# Patient Record
Sex: MALE | Race: WHITE
[De-identification: names, ages, dates, MRNs, and addresses within clinical notes are randomized per-mention and may not be internally consistent; named-entity substitution may affect disease eponyms.]

---

## 2018-03-09 ENCOUNTER — APPOINTMENT (OUTPATIENT)
Dept: ORTHOPEDIC SURGERY | Facility: CLINIC | Age: 58
End: 2018-03-09

## 2022-01-13 ENCOUNTER — APPOINTMENT (OUTPATIENT)
Dept: ENDOCRINOLOGY | Facility: CLINIC | Age: 62
End: 2022-01-13
Payer: COMMERCIAL

## 2022-01-13 ENCOUNTER — NON-APPOINTMENT (OUTPATIENT)
Age: 62
End: 2022-01-13

## 2022-01-13 VITALS
SYSTOLIC BLOOD PRESSURE: 104 MMHG | TEMPERATURE: 97.3 F | BODY MASS INDEX: 30.36 KG/M2 | WEIGHT: 205 LBS | OXYGEN SATURATION: 97 % | HEIGHT: 69 IN | RESPIRATION RATE: 16 BRPM | HEART RATE: 67 BPM | DIASTOLIC BLOOD PRESSURE: 60 MMHG

## 2022-01-13 DIAGNOSIS — Z00.00 ENCOUNTER FOR GENERAL ADULT MEDICAL EXAMINATION W/OUT ABNORMAL FINDINGS: ICD-10-CM

## 2022-01-13 DIAGNOSIS — I25.10 ATHEROSCLEROTIC HEART DISEASE OF NATIVE CORONARY ARTERY W/OUT ANGINA PECTORIS: ICD-10-CM

## 2022-01-13 DIAGNOSIS — Z83.3 FAMILY HISTORY OF DIABETES MELLITUS: ICD-10-CM

## 2022-01-13 LAB — GLUCOSE BLDC GLUCOMTR-MCNC: 113

## 2022-01-13 PROCEDURE — 36415 COLL VENOUS BLD VENIPUNCTURE: CPT

## 2022-01-13 PROCEDURE — 95251 CONT GLUC MNTR ANALYSIS I&R: CPT

## 2022-01-13 PROCEDURE — 95250 CONT GLUC MNTR PHYS/QHP EQP: CPT

## 2022-01-13 PROCEDURE — 99205 OFFICE O/P NEW HI 60 MIN: CPT | Mod: 25

## 2022-01-13 NOTE — ASSESSMENT
[Diabetes Foot Care] : diabetes foot care [Long Term Vascular Complications] : long term vascular complications of diabetes [Carbohydrate Consistent Diet] : carbohydrate consistent diet [Importance of Diet and Exercise] : importance of diet and exercise to improve glycemic control, achieve weight loss and improve cardiovascular health [Exercise/Effect on Glucose] : exercise/effect on glucose [Hypoglycemia Management] : hypoglycemia management [Glucagon Use] : glucagon use [Self Monitoring of Blood Glucose] : self monitoring of blood glucose [Retinopathy Screening] : Patient was referred to ophthalmology for retinopathy screening [Diabetic Medications] : Risks and benefits of diabetic medications were discussed [FreeTextEntry1] : 1. T2DM\par Current approaches to diabetes management are discussed with the patient. \par Target ranges for blood sugar, blood pressure and cholesterol reviewed, and risk reduction strategies verified. \par Hypoglycemia precautions reviewed with the patient. \par Suggested extensive diabetes education program, including nutritional and diabetes teaching and evaluation. \par Proper dietary restrictions and exercise routines discussed. \par Glucometer/SMBG and log book charting discussed.\par - Freida PRO and he'll consider wearing a personal CGMS\par - labs today\par - continue Jardiance 25 mg qd\par - potential options for GLP1 agonists vs DPP4 inhibitors, or even retrying a small dose of metformin ER reviewed\par - Lipitor 20 mg HS, and potentially uptitrate the dose vs switching to crestor (LDL goal < 70)\par - Losartan 50 mg qd\par \par \par 2. Abnormal TFT's\par - repeat full thyroid panel, TBG, antibodies\par \par RTC 2- 3 weeks for sensor download and CDE evaluation\par

## 2022-01-13 NOTE — HISTORY OF PRESENT ILLNESS
[FreeTextEntry1] : HISTORY OF PRESENT ILLNESS. \par \par Mr. LARSON was diagnosed with Diabetes Mellitus Type 2  more than 5 years ago. He reports history HTN, dyslipidemia, CAD (s/p PTCA x 1 in 05/21) and denies known complications of retinopathy, nephropathy, or neuropathy. He is presently on Jardiance 25 mg, Losartan 50 mg, Lipitor 20 mg HS. He was on metformin before, but stopped because of the stomach discomfort\par Blood sugars are checked once a day. \par Did not bring log book, but reported are typically as following: FBS- 120-160's, ppg- not checking\par Hypoglycemia frequency: none\par Fingerstick glucose in the office today is 113 mg/dL (fasting). \par Diet: not following ADA\par Exercise: none. Owns a transportation business.\par \par Lab review (8/21): a1c- 6.8, TSH- 0.53, T4- 15.3, LDL- 75, neg urine microalbumin, creat- 0.64, calcium- 8.8\par \par Last dilated eye - 2021\par Last podiatry visit  - none\par Last cardiology evaluation - Dr. Ryder (7/21)\par Last stress test - 4/21\par Last 2-D Echo -4/21\par Last nephrology evaluation - none\par Last neurology evaluation-none\par

## 2022-01-13 NOTE — CONSULT LETTER
[Dear  ___] : Dear  [unfilled], [Courtesy Letter:] : I had the pleasure of seeing your patient, [unfilled], in my office today. [Sincerely,] : Sincerely, [FreeTextEntry1] : Thank you for referring  Mr. BARRY LARSON to me for evaluation and treatment. Please, see attached consultation note. As always, if there are specific questions you would like to discuss, please feel free to contact me.\par Thank you for the courtesy of this evaluation.\par  [FreeTextEntry3] : Bhavya Helm MD, FACE, [facility]\par

## 2022-01-21 LAB
25(OH)D3 SERPL-MCNC: 24.7 NG/ML
ALBUMIN SERPL ELPH-MCNC: 5 G/DL
ALP BLD-CCNC: 50 U/L
ALT SERPL-CCNC: 37 U/L
ANION GAP SERPL CALC-SCNC: 17 MMOL/L
AST SERPL-CCNC: 24 U/L
BILIRUB SERPL-MCNC: 0.6 MG/DL
BUN SERPL-MCNC: 12 MG/DL
CALCIUM SERPL-MCNC: 9.9 MG/DL
CHLORIDE SERPL-SCNC: 99 MMOL/L
CHOLEST SERPL-MCNC: 164 MG/DL
CO2 SERPL-SCNC: 22 MMOL/L
CREAT SERPL-MCNC: 0.79 MG/DL
CREAT SPEC-SCNC: 52 MG/DL
ESTIMATED AVERAGE GLUCOSE: 154 MG/DL
FOLATE SERPL-MCNC: 15.7 NG/ML
FRUCTOSAMINE SERPL-MCNC: 309 UMOL/L
GLUCOSE SERPL-MCNC: 111 MG/DL
HBA1C MFR BLD HPLC: 7 %
HDLC SERPL-MCNC: 77 MG/DL
LDLC SERPL CALC-MCNC: 42 MG/DL
MICROALBUMIN 24H UR DL<=1MG/L-MCNC: <1.2 MG/DL
MICROALBUMIN/CREAT 24H UR-RTO: NORMAL MG/G
NONHDLC SERPL-MCNC: 87 MG/DL
POTASSIUM SERPL-SCNC: 4.8 MMOL/L
PROT SERPL-MCNC: 7.5 G/DL
SODIUM SERPL-SCNC: 138 MMOL/L
T3 SERPL-MCNC: 154 NG/DL
T4 FREE SERPL-MCNC: 1.3 NG/DL
T4 SERPL-MCNC: 12.3 UG/DL
T4BG SERPL-MCNC: 29 UG/ML
THYROGLOB AB SERPL-ACNC: <20 IU/ML
THYROPEROXIDASE AB SERPL IA-ACNC: <10 IU/ML
TRIGL SERPL-MCNC: 226 MG/DL
TSH RECEPTOR AB: <1.1 IU/L
TSH SERPL-ACNC: 0.61 UIU/ML
TSI ACT/NOR SER: <0.1 IU/L
VIT B12 SERPL-MCNC: 462 PG/ML

## 2022-01-23 LAB — T4 FREE SERPL DIALY-MCNC: 1.5 NG/DL

## 2022-02-01 ENCOUNTER — APPOINTMENT (OUTPATIENT)
Dept: ENDOCRINOLOGY | Facility: CLINIC | Age: 62
End: 2022-02-01
Payer: COMMERCIAL

## 2022-02-01 PROCEDURE — G0108 DIAB MANAGE TRN  PER INDIV: CPT

## 2022-03-02 ENCOUNTER — RX RENEWAL (OUTPATIENT)
Age: 62
End: 2022-03-02

## 2022-04-25 ENCOUNTER — APPOINTMENT (OUTPATIENT)
Dept: ENDOCRINOLOGY | Facility: CLINIC | Age: 62
End: 2022-04-25
Payer: COMMERCIAL

## 2022-04-25 VITALS
TEMPERATURE: 98 F | OXYGEN SATURATION: 98 % | BODY MASS INDEX: 31.1 KG/M2 | SYSTOLIC BLOOD PRESSURE: 128 MMHG | HEART RATE: 90 BPM | DIASTOLIC BLOOD PRESSURE: 70 MMHG | HEIGHT: 69 IN | WEIGHT: 210 LBS

## 2022-04-25 LAB — GLUCOSE BLDC GLUCOMTR-MCNC: 183

## 2022-04-25 PROCEDURE — 99214 OFFICE O/P EST MOD 30 MIN: CPT | Mod: 25

## 2022-04-25 PROCEDURE — 82962 GLUCOSE BLOOD TEST: CPT

## 2022-04-25 RX ORDER — ATORVASTATIN CALCIUM 20 MG/1
20 TABLET, FILM COATED ORAL
Refills: 0 | Status: DISCONTINUED | COMMUNITY
End: 2022-04-25

## 2022-04-25 NOTE — HISTORY OF PRESENT ILLNESS
[FreeTextEntry1] : F/u for diabetes management\par \par *** Apr 25, 2022 ***\par \par taking Ozempic 0.5 mg qw, Jardiance 25 mg, Losartan 100 mg, Lipitor 20 mg HS\par feels great, but gained some weight. not exercising now. Diet might be a bit worse, wine - 2 glasses a day with snacks\par labs from 4/5/22- a1c- 6.1, TSH- 1.96, TG- 224, LDL- 75\par reports fbs- under 120, not checking\par \par \par HISTORY OF PRESENT ILLNESS. \par \par Mr. LARSON was diagnosed with Diabetes Mellitus Type 2  more than 5 years ago. He reports history HTN, dyslipidemia, CAD (s/p PTCA x 1 in 05/21) and denies known complications of retinopathy, nephropathy, or neuropathy. He is presently on Jardiance 25 mg, Losartan 50 mg, Lipitor 20 mg HS. He was on metformin before, but stopped because of the stomach discomfort\par Blood sugars are checked once a day. \par Did not bring log book, but reported are typically as following: FBS- 120-160's, ppg- not checking\par Hypoglycemia frequency: none\par Fingerstick glucose in the office today is 113 mg/dL (fasting). \par Diet: not following ADA\par Exercise: none. Owns a transportation business.\par \par Lab review (8/21): a1c- 6.8, TSH- 0.53, T4- 15.3, LDL- 75, neg urine microalbumin, creat- 0.64, calcium- 8.8\par \par Last dilated eye - 2021\par Last podiatry visit  - none\par Last cardiology evaluation - Dr. Ryder (7/21)\par Last stress test - 4/21\par Last 2-D Echo -4/21\par Last nephrology evaluation - none\par Last neurology evaluation-none\par

## 2022-04-25 NOTE — ASSESSMENT
[Diabetes Foot Care] : diabetes foot care [Long Term Vascular Complications] : long term vascular complications of diabetes [Carbohydrate Consistent Diet] : carbohydrate consistent diet [Importance of Diet and Exercise] : importance of diet and exercise to improve glycemic control, achieve weight loss and improve cardiovascular health [Exercise/Effect on Glucose] : exercise/effect on glucose [Hypoglycemia Management] : hypoglycemia management [Glucagon Use] : glucagon use [Self Monitoring of Blood Glucose] : self monitoring of blood glucose [Retinopathy Screening] : Patient was referred to ophthalmology for retinopathy screening [Diabetic Medications] : Risks and benefits of diabetic medications were discussed [FreeTextEntry1] : 1. T2DM, well controlled\par - Jardiance 25 mg qd, Ozempic 0.5 mg qw\par - potential options for GLP1 agonists vs DPP4 inhibitors, or even retrying a small dose of metformin ER reviewed\par - d/c Lipitor. Start Crestor 20 mg HS (LDL goal < 70, will benefit with TG). can add fish oil. improve the diet, exercises\par - Losartan 100 mg qd\par \par \par 2. Abnormal TFT's\par - normal TFT's. elevated total T4 can be secondary to binding proteins\par \par RTC 3-4 mos\par

## 2022-08-15 ENCOUNTER — APPOINTMENT (OUTPATIENT)
Dept: ENDOCRINOLOGY | Facility: CLINIC | Age: 62
End: 2022-08-15

## 2022-08-15 VITALS
HEART RATE: 73 BPM | WEIGHT: 197 LBS | SYSTOLIC BLOOD PRESSURE: 136 MMHG | BODY MASS INDEX: 29.18 KG/M2 | OXYGEN SATURATION: 96 % | RESPIRATION RATE: 17 BRPM | TEMPERATURE: 97.7 F | HEIGHT: 69 IN | DIASTOLIC BLOOD PRESSURE: 82 MMHG

## 2022-08-15 LAB — GLUCOSE BLDC GLUCOMTR-MCNC: 107

## 2022-08-15 PROCEDURE — 99214 OFFICE O/P EST MOD 30 MIN: CPT | Mod: 25

## 2022-08-15 PROCEDURE — 36415 COLL VENOUS BLD VENIPUNCTURE: CPT

## 2022-08-15 PROCEDURE — 82962 GLUCOSE BLOOD TEST: CPT

## 2022-08-15 RX ORDER — PRASUGREL 10 MG/1
10 TABLET, FILM COATED ORAL DAILY
Refills: 0 | Status: DISCONTINUED | COMMUNITY
End: 2022-08-15

## 2022-08-15 NOTE — ASSESSMENT
[Diabetes Foot Care] : diabetes foot care [Long Term Vascular Complications] : long term vascular complications of diabetes [Carbohydrate Consistent Diet] : carbohydrate consistent diet [Importance of Diet and Exercise] : importance of diet and exercise to improve glycemic control, achieve weight loss and improve cardiovascular health [Exercise/Effect on Glucose] : exercise/effect on glucose [Hypoglycemia Management] : hypoglycemia management [Glucagon Use] : glucagon use [Self Monitoring of Blood Glucose] : self monitoring of blood glucose [Retinopathy Screening] : Patient was referred to ophthalmology for retinopathy screening [Diabetic Medications] : Risks and benefits of diabetic medications were discussed [FreeTextEntry1] : 1. T2DM, well controlled\par - Jardiance 25 mg qd, Ozempic 0.5 mg qw (patient declined increasing the dose)\par - potential  retrying a small dose of metformin ER reviewed\par - Crestor 20 mg HS (LDL goal < 70, will benefit with TG). can add fish oil. improve the diet, exercises\par - Losartan 100 mg qd\par \par \par 2. Abnormal TFT's\par - normal TFT's. elevated total T4 can be secondary to binding proteins\par \par RTC 4 mos\par

## 2022-08-15 NOTE — HISTORY OF PRESENT ILLNESS
[FreeTextEntry1] : F/u for diabetes management\par \par *** Aug 15, 2022 ***\par \par feels great, lost more weight. physically active\par on Jardiance 25 mg qd, Ozempic 0.5 mg qw, Losartan 100 mg,  Crestor 20 mg HS\par stopped blood thinners\par reports fbs- 110-120, ppg- not checking\par \par *** Apr 25, 2022 ***\par \par taking Ozempic 0.5 mg qw, Jardiance 25 mg, Losartan 100 mg, Lipitor 20 mg HS\par feels great, but gained some weight. not exercising now. Diet might be a bit worse, wine - 2 glasses a day with snacks\par labs from 4/5/22- a1c- 6.1, TSH- 1.96, TG- 224, LDL- 75\par reports fbs- under 120, not checking\par \par \par HISTORY OF PRESENT ILLNESS. \par \par Mr. LARSON was diagnosed with Diabetes Mellitus Type 2  more than 5 years ago. He reports history HTN, dyslipidemia, CAD (s/p PTCA x 1 in 05/21) and denies known complications of retinopathy, nephropathy, or neuropathy. He is presently on Jardiance 25 mg, Losartan 50 mg, Lipitor 20 mg HS. He was on metformin before, but stopped because of the stomach discomfort\par Blood sugars are checked once a day. \par Did not bring log book, but reported are typically as following: FBS- 120-160's, ppg- not checking\par Hypoglycemia frequency: none\par Fingerstick glucose in the office today is 113 mg/dL (fasting). \par Diet: not following ADA\par Exercise: none. Owns a transportation business.\par \par Lab review (8/21): a1c- 6.8, TSH- 0.53, T4- 15.3, LDL- 75, neg urine microalbumin, creat- 0.64, calcium- 8.8\par \par Last dilated eye - 2021\par Last podiatry visit  - none\par Last cardiology evaluation - Dr. Ryder (7/21)\par Last stress test - 4/21\par Last 2-D Echo -4/21\par Last nephrology evaluation - none\par Last neurology evaluation-none\par

## 2022-08-19 LAB
25(OH)D3 SERPL-MCNC: 43.9 NG/ML
ALBUMIN SERPL ELPH-MCNC: 4.9 G/DL
ALP BLD-CCNC: 39 U/L
ALT SERPL-CCNC: 30 U/L
ANION GAP SERPL CALC-SCNC: 16 MMOL/L
AST SERPL-CCNC: 23 U/L
BILIRUB SERPL-MCNC: 0.4 MG/DL
BUN SERPL-MCNC: 11 MG/DL
CALCIUM SERPL-MCNC: 9.7 MG/DL
CHLORIDE SERPL-SCNC: 101 MMOL/L
CHOLEST SERPL-MCNC: 132 MG/DL
CO2 SERPL-SCNC: 22 MMOL/L
CREAT SERPL-MCNC: 0.73 MG/DL
CREAT SPEC-SCNC: 79 MG/DL
EGFR: 103 ML/MIN/1.73M2
ESTIMATED AVERAGE GLUCOSE: 143 MG/DL
FRUCTOSAMINE SERPL-MCNC: 282 UMOL/L
GLUCOSE SERPL-MCNC: 105 MG/DL
HBA1C MFR BLD HPLC: 6.6 %
HDLC SERPL-MCNC: 60 MG/DL
LDLC SERPL CALC-MCNC: 57 MG/DL
MICROALBUMIN 24H UR DL<=1MG/L-MCNC: <1.2 MG/DL
MICROALBUMIN/CREAT 24H UR-RTO: NORMAL MG/G
NONHDLC SERPL-MCNC: 72 MG/DL
POTASSIUM SERPL-SCNC: 4.4 MMOL/L
PROT SERPL-MCNC: 7.1 G/DL
SODIUM SERPL-SCNC: 138 MMOL/L
T4 FREE SERPL-MCNC: 1.3 NG/DL
THYROGLOB AB SERPL-ACNC: <20 IU/ML
THYROPEROXIDASE AB SERPL IA-ACNC: 13 IU/ML
TRIGL SERPL-MCNC: 77 MG/DL
TSH SERPL-ACNC: 0.83 UIU/ML

## 2022-08-23 LAB — T4 FREE SERPL DIALY-MCNC: 1.2 NG/DL

## 2022-12-15 ENCOUNTER — APPOINTMENT (OUTPATIENT)
Dept: ENDOCRINOLOGY | Facility: CLINIC | Age: 62
End: 2022-12-15

## 2022-12-15 VITALS
WEIGHT: 197 LBS | OXYGEN SATURATION: 97 % | HEART RATE: 74 BPM | RESPIRATION RATE: 16 BRPM | TEMPERATURE: 97.3 F | DIASTOLIC BLOOD PRESSURE: 68 MMHG | BODY MASS INDEX: 29.18 KG/M2 | SYSTOLIC BLOOD PRESSURE: 132 MMHG | HEIGHT: 69 IN

## 2022-12-15 LAB
FRUCTOSAMINE SERPL-MCNC: 297 UMOL/L
GLUCOSE BLDC GLUCOMTR-MCNC: 116

## 2022-12-15 PROCEDURE — 82962 GLUCOSE BLOOD TEST: CPT

## 2022-12-15 PROCEDURE — 99214 OFFICE O/P EST MOD 30 MIN: CPT | Mod: 25

## 2022-12-15 PROCEDURE — 36415 COLL VENOUS BLD VENIPUNCTURE: CPT

## 2022-12-15 NOTE — ASSESSMENT
[Diabetes Foot Care] : diabetes foot care [Long Term Vascular Complications] : long term vascular complications of diabetes [Carbohydrate Consistent Diet] : carbohydrate consistent diet [Importance of Diet and Exercise] : importance of diet and exercise to improve glycemic control, achieve weight loss and improve cardiovascular health [Exercise/Effect on Glucose] : exercise/effect on glucose [Hypoglycemia Management] : hypoglycemia management [Glucagon Use] : glucagon use [Self Monitoring of Blood Glucose] : self monitoring of blood glucose [Retinopathy Screening] : Patient was referred to ophthalmology for retinopathy screening [Diabetic Medications] : Risks and benefits of diabetic medications were discussed [FreeTextEntry1] : 1. T2DM, well controlled\par - Jardiance 25 mg qd, Ozempic 0.5 mg qw (patient declined increasing the dose)\par - potential  retrying a small dose of metformin ER reviewed\par - Crestor 20 mg HS (LDL goal < 70, will benefit with TG). can add fish oil. improve the diet, exercises\par - Losartan 50 mg qd\par \par 2. Abnormal TFT's\par - normal TFT's. elevated total T4 can be secondary to binding proteins\par \par RTC 4 mos\par

## 2022-12-15 NOTE — HISTORY OF PRESENT ILLNESS
[FreeTextEntry1] : F/u for diabetes management\par \par *** Dec 15, 2022 ***\par \par feels great, no new c/o. keeping the weight stable\par stays on Jardiance 25 mg qd, Ozempic 0.5 mg qw,  Losartan 50 mg (went back on a smaller dose),  Crestor 20 mg HS\par reports fbs- 120's\par \par *** Aug 15, 2022 ***\par \par feels great, lost more weight. physically active\par on Jardiance 25 mg qd, Ozempic 0.5 mg qw, Losartan 100 mg,  Crestor 20 mg HS\par stopped blood thinners\par reports fbs- 110-120, ppg- not checking\par \par *** Apr 25, 2022 ***\par \par taking Ozempic 0.5 mg qw, Jardiance 25 mg, Losartan 100 mg, Lipitor 20 mg HS\par feels great, but gained some weight. not exercising now. Diet might be a bit worse, wine - 2 glasses a day with snacks\par labs from 4/5/22- a1c- 6.1, TSH- 1.96, TG- 224, LDL- 75\par reports fbs- under 120, not checking\par \par \par HISTORY OF PRESENT ILLNESS. \par \par Mr. LARSON was diagnosed with Diabetes Mellitus Type 2  more than 5 years ago. He reports history HTN, dyslipidemia, CAD (s/p PTCA x 1 in 05/21) and denies known complications of retinopathy, nephropathy, or neuropathy. He is presently on Jardiance 25 mg, Losartan 50 mg, Lipitor 20 mg HS. He was on metformin before, but stopped because of the stomach discomfort\par Blood sugars are checked once a day. \par Did not bring log book, but reported are typically as following: FBS- 120-160's, ppg- not checking\par Hypoglycemia frequency: none\par Fingerstick glucose in the office today is 113 mg/dL (fasting). \par Diet: not following ADA\par Exercise: none. Owns a transportation business.\par \par Lab review (8/21): a1c- 6.8, TSH- 0.53, T4- 15.3, LDL- 75, neg urine microalbumin, creat- 0.64, calcium- 8.8\par \par Last dilated eye - 2021\par Last podiatry visit  - none\par Last cardiology evaluation - Dr. Ryder (7/21)\par Last stress test - 4/21\par Last 2-D Echo -4/21\par Last nephrology evaluation - none\par Last neurology evaluation-none\par

## 2022-12-16 LAB
ALBUMIN SERPL ELPH-MCNC: 4.9 G/DL
ALP BLD-CCNC: 46 U/L
ALT SERPL-CCNC: 30 U/L
ANION GAP SERPL CALC-SCNC: 18 MMOL/L
AST SERPL-CCNC: 21 U/L
BILIRUB SERPL-MCNC: 0.4 MG/DL
BUN SERPL-MCNC: 20 MG/DL
CALCIUM SERPL-MCNC: 9.7 MG/DL
CHLORIDE SERPL-SCNC: 98 MMOL/L
CHOLEST SERPL-MCNC: 153 MG/DL
CO2 SERPL-SCNC: 22 MMOL/L
CREAT SERPL-MCNC: 0.83 MG/DL
CREAT SPEC-SCNC: 53 MG/DL
EGFR: 99 ML/MIN/1.73M2
ESTIMATED AVERAGE GLUCOSE: 148 MG/DL
FOLATE SERPL-MCNC: >20 NG/ML
GLUCOSE SERPL-MCNC: 100 MG/DL
HBA1C MFR BLD HPLC: 6.8 %
HDLC SERPL-MCNC: 71 MG/DL
LDLC SERPL CALC-MCNC: 60 MG/DL
MICROALBUMIN 24H UR DL<=1MG/L-MCNC: <1.2 MG/DL
MICROALBUMIN/CREAT 24H UR-RTO: NORMAL MG/G
NONHDLC SERPL-MCNC: 82 MG/DL
POTASSIUM SERPL-SCNC: 4.5 MMOL/L
PROT SERPL-MCNC: 7.4 G/DL
SODIUM SERPL-SCNC: 138 MMOL/L
T4 FREE SERPL-MCNC: 1.3 NG/DL
TRIGL SERPL-MCNC: 110 MG/DL
TSH SERPL-ACNC: 0.9 UIU/ML
VIT B12 SERPL-MCNC: 688 PG/ML

## 2023-04-18 ENCOUNTER — APPOINTMENT (OUTPATIENT)
Dept: ENDOCRINOLOGY | Facility: CLINIC | Age: 63
End: 2023-04-18
Payer: COMMERCIAL

## 2023-04-18 VITALS
HEIGHT: 69 IN | HEART RATE: 70 BPM | SYSTOLIC BLOOD PRESSURE: 120 MMHG | BODY MASS INDEX: 30.21 KG/M2 | TEMPERATURE: 97.3 F | OXYGEN SATURATION: 95 % | WEIGHT: 204 LBS | RESPIRATION RATE: 16 BRPM | DIASTOLIC BLOOD PRESSURE: 70 MMHG

## 2023-04-18 LAB — GLUCOSE BLDC GLUCOMTR-MCNC: 113

## 2023-04-18 PROCEDURE — 36415 COLL VENOUS BLD VENIPUNCTURE: CPT

## 2023-04-18 PROCEDURE — 99214 OFFICE O/P EST MOD 30 MIN: CPT | Mod: 25

## 2023-04-18 PROCEDURE — 82962 GLUCOSE BLOOD TEST: CPT

## 2023-04-18 NOTE — HISTORY OF PRESENT ILLNESS
[FreeTextEntry1] : F/u for diabetes management\par \par *** Apr 18, 2023 ***\par \par feels great, no new c/o. gained some weight \par stays on Jardiance 25 mg qd, Ozempic 0.5 mg qw,  Losartan 50 mg ,  Crestor 20 mg HS\par reports fbs- 110's\par \par *** Dec 15, 2022 ***\par \par feels great, no new c/o. keeping the weight stable\par stays on Jardiance 25 mg qd, Ozempic 0.5 mg qw,  Losartan 50 mg (went back on a smaller dose),  Crestor 20 mg HS\par reports fbs- 120's\par \par *** Aug 15, 2022 ***\par \par feels great, lost more weight. physically active\par on Jardiance 25 mg qd, Ozempic 0.5 mg qw, Losartan 100 mg,  Crestor 20 mg HS\par stopped blood thinners\par reports fbs- 110-120, ppg- not checking\par \par *** Apr 25, 2022 ***\par \par taking Ozempic 0.5 mg qw, Jardiance 25 mg, Losartan 100 mg, Lipitor 20 mg HS\par feels great, but gained some weight. not exercising now. Diet might be a bit worse, wine - 2 glasses a day with snacks\par labs from 4/5/22- a1c- 6.1, TSH- 1.96, TG- 224, LDL- 75\par reports fbs- under 120, not checking\par \par \par HISTORY OF PRESENT ILLNESS. \par \par Mr. LARSON was diagnosed with Diabetes Mellitus Type 2  more than 5 years ago. He reports history HTN, dyslipidemia, CAD (s/p PTCA x 1 in 05/21) and denies known complications of retinopathy, nephropathy, or neuropathy. He is presently on Jardiance 25 mg, Losartan 50 mg, Lipitor 20 mg HS. He was on metformin before, but stopped because of the stomach discomfort\par Blood sugars are checked once a day. \par Did not bring log book, but reported are typically as following: FBS- 120-160's, ppg- not checking\par Hypoglycemia frequency: none\par Fingerstick glucose in the office today is 113 mg/dL (fasting). \par Diet: not following ADA\par Exercise: none. Owns a transportation business.\par \par Lab review (8/21): a1c- 6.8, TSH- 0.53, T4- 15.3, LDL- 75, neg urine microalbumin, creat- 0.64, calcium- 8.8\par \par Last dilated eye - 2021\par Last podiatry visit  - none\par Last cardiology evaluation - Dr. Ryder (7/21)\par Last stress test - 4/21\par Last 2-D Echo -4/21\par Last nephrology evaluation - none\par Last neurology evaluation-none\par

## 2023-04-18 NOTE — ASSESSMENT
[Diabetes Foot Care] : diabetes foot care [Long Term Vascular Complications] : long term vascular complications of diabetes [Carbohydrate Consistent Diet] : carbohydrate consistent diet [Importance of Diet and Exercise] : importance of diet and exercise to improve glycemic control, achieve weight loss and improve cardiovascular health [Exercise/Effect on Glucose] : exercise/effect on glucose [Hypoglycemia Management] : hypoglycemia management [Glucagon Use] : glucagon use [Self Monitoring of Blood Glucose] : self monitoring of blood glucose [Retinopathy Screening] : Patient was referred to ophthalmology for retinopathy screening [Diabetic Medications] : Risks and benefits of diabetic medications were discussed [FreeTextEntry1] : 1. T2DM, well controlled\par - Jardiance 25 mg qd, Ozempic 0.5 mg qw (patient declined increasing the dose, wants to wait for the blood work results)\par - potential  retrying a small dose of metformin ER reviewed\par - Crestor 20 mg HS (LDL goal < 70, will benefit with TG). can add fish oil. improve the diet, exercises\par - Losartan 50 mg qd\par \par 2. Abnormal TFT's\par - normal TFT's. elevated total T4 can be secondary to binding proteins\par \par RTC 4 mos\par

## 2023-04-19 ENCOUNTER — NON-APPOINTMENT (OUTPATIENT)
Age: 63
End: 2023-04-19

## 2023-04-19 LAB
ALBUMIN SERPL ELPH-MCNC: 4.9 G/DL
ALP BLD-CCNC: 41 U/L
ALT SERPL-CCNC: 44 U/L
ANION GAP SERPL CALC-SCNC: 15 MMOL/L
AST SERPL-CCNC: 30 U/L
BILIRUB SERPL-MCNC: 0.4 MG/DL
BUN SERPL-MCNC: 11 MG/DL
CALCIUM SERPL-MCNC: 9.5 MG/DL
CHLORIDE SERPL-SCNC: 102 MMOL/L
CHOLEST SERPL-MCNC: 157 MG/DL
CO2 SERPL-SCNC: 22 MMOL/L
CREAT SERPL-MCNC: 0.64 MG/DL
CREAT SPEC-SCNC: 45 MG/DL
EGFR: 106 ML/MIN/1.73M2
ESTIMATED AVERAGE GLUCOSE: 146 MG/DL
FOLATE SERPL-MCNC: >20 NG/ML
FRUCTOSAMINE SERPL-MCNC: 301 UMOL/L
GLUCOSE SERPL-MCNC: 114 MG/DL
HBA1C MFR BLD HPLC: 6.7 %
HDLC SERPL-MCNC: 68 MG/DL
LDLC SERPL CALC-MCNC: 59 MG/DL
MICROALBUMIN 24H UR DL<=1MG/L-MCNC: <1.2 MG/DL
MICROALBUMIN/CREAT 24H UR-RTO: NORMAL MG/G
NONHDLC SERPL-MCNC: 89 MG/DL
POTASSIUM SERPL-SCNC: 4.7 MMOL/L
PROT SERPL-MCNC: 7.2 G/DL
SODIUM SERPL-SCNC: 139 MMOL/L
T4 FREE SERPL-MCNC: 1.3 NG/DL
TRIGL SERPL-MCNC: 147 MG/DL
TSH SERPL-ACNC: 1.17 UIU/ML
VIT B12 SERPL-MCNC: 644 PG/ML

## 2023-08-28 ENCOUNTER — APPOINTMENT (OUTPATIENT)
Dept: ENDOCRINOLOGY | Facility: CLINIC | Age: 63
End: 2023-08-28
Payer: COMMERCIAL

## 2023-08-28 VITALS
TEMPERATURE: 97.1 F | SYSTOLIC BLOOD PRESSURE: 136 MMHG | DIASTOLIC BLOOD PRESSURE: 68 MMHG | RESPIRATION RATE: 16 BRPM | OXYGEN SATURATION: 97 % | HEIGHT: 69 IN | BODY MASS INDEX: 29.03 KG/M2 | HEART RATE: 67 BPM | WEIGHT: 196 LBS

## 2023-08-28 DIAGNOSIS — E11.9 TYPE 2 DIABETES MELLITUS W/OUT COMPLICATIONS: ICD-10-CM

## 2023-08-28 LAB — GLUCOSE BLDC GLUCOMTR-MCNC: 131

## 2023-08-28 PROCEDURE — 99214 OFFICE O/P EST MOD 30 MIN: CPT | Mod: 25

## 2023-08-28 PROCEDURE — 36415 COLL VENOUS BLD VENIPUNCTURE: CPT

## 2023-08-28 PROCEDURE — 82962 GLUCOSE BLOOD TEST: CPT

## 2023-08-28 NOTE — HISTORY OF PRESENT ILLNESS
[FreeTextEntry1] : F/u for diabetes management  *** Aug 28, 2023 ***  feels well, diet has been worse over the past several months but able to keep the weight down Jardiance 25 mg qd, Ozempic 0.5 mg qw , Losartan 50 mg ,  Crestor 20 mg HS reports fbs -   *** Apr 18, 2023 ***  feels great, no new c/o. gained some weight  stays on Jardiance 25 mg qd, Ozempic 0.5 mg qw,  Losartan 50 mg ,  Crestor 20 mg HS reports fbs- 110's  *** Dec 15, 2022 ***  feels great, no new c/o. keeping the weight stable stays on Jardiance 25 mg qd, Ozempic 0.5 mg qw,  Losartan 50 mg (went back on a smaller dose),  Crestor 20 mg HS reports fbs- 120's  *** Aug 15, 2022 ***  feels great, lost more weight. physically active on Jardiance 25 mg qd, Ozempic 0.5 mg qw, Losartan 100 mg,  Crestor 20 mg HS stopped blood thinners reports fbs- 110-120, ppg- not checking  *** Apr 25, 2022 ***  taking Ozempic 0.5 mg qw, Jardiance 25 mg, Losartan 100 mg, Lipitor 20 mg HS feels great, but gained some weight. not exercising now. Diet might be a bit worse, wine - 2 glasses a day with snacks labs from 4/5/22- a1c- 6.1, TSH- 1.96, TG- 224, LDL- 75 reports fbs- under 120, not checking   HISTORY OF PRESENT ILLNESS.   Mr. LARSON was diagnosed with Diabetes Mellitus Type 2  more than 5 years ago. He reports history HTN, dyslipidemia, CAD (s/p PTCA x 1 in 05/21) and denies known complications of retinopathy, nephropathy, or neuropathy. He is presently on Jardiance 25 mg, Losartan 50 mg, Lipitor 20 mg HS. He was on metformin before, but stopped because of the stomach discomfort Blood sugars are checked once a day.  Did not bring log book, but reported are typically as following: FBS- 120-160's, ppg- not checking Hypoglycemia frequency: none Fingerstick glucose in the office today is 113 mg/dL (fasting).  Diet: not following ADA Exercise: none. Owns a transportation business.  Lab review (8/21): a1c- 6.8, TSH- 0.53, T4- 15.3, LDL- 75, neg urine microalbumin, creat- 0.64, calcium- 8.8  Last dilated eye - 2021 Last podiatry visit  - none Last cardiology evaluation - Dr. Ryder (7/21) Last stress test - 4/21 Last 2-D Echo -4/21 Last nephrology evaluation - none Last neurology evaluation-none

## 2023-08-28 NOTE — ASSESSMENT
[Diabetes Foot Care] : diabetes foot care [Long Term Vascular Complications] : long term vascular complications of diabetes [Carbohydrate Consistent Diet] : carbohydrate consistent diet [Importance of Diet and Exercise] : importance of diet and exercise to improve glycemic control, achieve weight loss and improve cardiovascular health [Exercise/Effect on Glucose] : exercise/effect on glucose [Hypoglycemia Management] : hypoglycemia management [Glucagon Use] : glucagon use [Self Monitoring of Blood Glucose] : self monitoring of blood glucose [Retinopathy Screening] : Patient was referred to ophthalmology for retinopathy screening [Diabetic Medications] : Risks and benefits of diabetic medications were discussed [FreeTextEntry1] : 1. T2DM, well controlled - Jardiance 25 mg qd, Ozempic 0.5 mg qw (patient declined increasing the dose, wants to wait for the blood work results) - potential  retrying a small dose of metformin ER reviewed - Crestor 20 mg HS (LDL goal < 70, will benefit with TG). can add fish oil. improve the diet, exercises - Losartan 50 mg qd. Monitor blood pressure - f/u ophthalmologist  2. Abnormal TFT's - normal TFT's. elevated total T4 can be secondary to binding proteins  RTC 4 mos

## 2023-09-06 LAB
ALBUMIN SERPL ELPH-MCNC: 4.9 G/DL
ALP BLD-CCNC: 43 U/L
ALT SERPL-CCNC: 22 U/L
ANION GAP SERPL CALC-SCNC: 18 MMOL/L
AST SERPL-CCNC: 16 U/L
BILIRUB SERPL-MCNC: 0.4 MG/DL
BUN SERPL-MCNC: 15 MG/DL
CALCIUM SERPL-MCNC: 10 MG/DL
CHLORIDE SERPL-SCNC: 100 MMOL/L
CHOLEST SERPL-MCNC: 153 MG/DL
CO2 SERPL-SCNC: 20 MMOL/L
CREAT SERPL-MCNC: 0.68 MG/DL
CREAT SPEC-SCNC: 34 MG/DL
EGFR: 104 ML/MIN/1.73M2
ESTIMATED AVERAGE GLUCOSE: 157 MG/DL
FOLATE SERPL-MCNC: 12 NG/ML
FRUCTOSAMINE SERPL-MCNC: 294 UMOL/L
GLUCOSE SERPL-MCNC: 150 MG/DL
HBA1C MFR BLD HPLC: 7.1 %
HDLC SERPL-MCNC: 70 MG/DL
LDLC SERPL CALC-MCNC: 64 MG/DL
MICROALBUMIN 24H UR DL<=1MG/L-MCNC: <1.2 MG/DL
MICROALBUMIN/CREAT 24H UR-RTO: NORMAL MG/G
NONHDLC SERPL-MCNC: 83 MG/DL
POTASSIUM SERPL-SCNC: 4.7 MMOL/L
PROT SERPL-MCNC: 7.6 G/DL
SODIUM SERPL-SCNC: 138 MMOL/L
T4 FREE SERPL DIALY-MCNC: 0.94 NG/DL
T4 FREE SERPL-MCNC: 1.4 NG/DL
T4BG SERPL-MCNC: 32 UG/ML
TRIGL SERPL-MCNC: 111 MG/DL
TSH SERPL-ACNC: 1.1 UIU/ML
VIT B12 SERPL-MCNC: 476 PG/ML

## 2023-09-18 ENCOUNTER — RX RENEWAL (OUTPATIENT)
Age: 63
End: 2023-09-18

## 2023-11-16 RX ORDER — SEMAGLUTIDE 0.68 MG/ML
2 INJECTION, SOLUTION SUBCUTANEOUS
Qty: 3 | Refills: 2 | Status: ACTIVE | COMMUNITY
Start: 2022-02-01 | End: 1900-01-01

## 2023-12-05 ENCOUNTER — RX RENEWAL (OUTPATIENT)
Age: 63
End: 2023-12-05

## 2023-12-13 ENCOUNTER — APPOINTMENT (OUTPATIENT)
Dept: ENDOCRINOLOGY | Facility: CLINIC | Age: 63
End: 2023-12-13
Payer: COMMERCIAL

## 2023-12-13 VITALS
BODY MASS INDEX: 29.77 KG/M2 | OXYGEN SATURATION: 96 % | HEART RATE: 73 BPM | SYSTOLIC BLOOD PRESSURE: 137 MMHG | HEIGHT: 69 IN | WEIGHT: 201 LBS | RESPIRATION RATE: 16 BRPM | DIASTOLIC BLOOD PRESSURE: 90 MMHG | TEMPERATURE: 98.2 F

## 2023-12-13 LAB — GLUCOSE BLDC GLUCOMTR-MCNC: 112

## 2023-12-13 PROCEDURE — 82962 GLUCOSE BLOOD TEST: CPT

## 2023-12-13 PROCEDURE — 99214 OFFICE O/P EST MOD 30 MIN: CPT | Mod: 25

## 2023-12-13 PROCEDURE — 36415 COLL VENOUS BLD VENIPUNCTURE: CPT

## 2023-12-13 NOTE — HISTORY OF PRESENT ILLNESS
[FreeTextEntry1] : F/u for diabetes management  *** Dec 13, 2023 ***  feels well. gained a few lbs over the holidays staying on Jardiance 25 mg qd, Ozempic 0.5 mg qw , Losartan 50 mg ,  Crestor 20 mg HS reports fbs- 120's  *** Aug 28, 2023 ***  feels well, diet has been worse over the past several months but able to keep the weight down Jardiance 25 mg qd, Ozempic 0.5 mg qw , Losartan 50 mg ,  Crestor 20 mg HS reports fbs -   *** Apr 18, 2023 ***  feels great, no new c/o. gained some weight  stays on Jardiance 25 mg qd, Ozempic 0.5 mg qw,  Losartan 50 mg ,  Crestor 20 mg HS reports fbs- 110's  *** Dec 15, 2022 ***  feels great, no new c/o. keeping the weight stable stays on Jardiance 25 mg qd, Ozempic 0.5 mg qw,  Losartan 50 mg (went back on a smaller dose),  Crestor 20 mg HS reports fbs- 120's  *** Aug 15, 2022 ***  feels great, lost more weight. physically active on Jardiance 25 mg qd, Ozempic 0.5 mg qw, Losartan 100 mg,  Crestor 20 mg HS stopped blood thinners reports fbs- 110-120, ppg- not checking  *** Apr 25, 2022 ***  taking Ozempic 0.5 mg qw, Jardiance 25 mg, Losartan 100 mg, Lipitor 20 mg HS feels great, but gained some weight. not exercising now. Diet might be a bit worse, wine - 2 glasses a day with snacks labs from 4/5/22- a1c- 6.1, TSH- 1.96, TG- 224, LDL- 75 reports fbs- under 120, not checking   HISTORY OF PRESENT ILLNESS.   Mr. LARSON was diagnosed with Diabetes Mellitus Type 2  more than 5 years ago. He reports history HTN, dyslipidemia, CAD (s/p PTCA x 1 in 05/21) and denies known complications of retinopathy, nephropathy, or neuropathy. He is presently on Jardiance 25 mg, Losartan 50 mg, Lipitor 20 mg HS. He was on metformin before, but stopped because of the stomach discomfort Blood sugars are checked once a day.  Did not bring log book, but reported are typically as following: FBS- 120-160's, ppg- not checking Hypoglycemia frequency: none Fingerstick glucose in the office today is 113 mg/dL (fasting).  Diet: not following ADA Exercise: none. Owns a transportation business.  Lab review (8/21): a1c- 6.8, TSH- 0.53, T4- 15.3, LDL- 75, neg urine microalbumin, creat- 0.64, calcium- 8.8  Last dilated eye - 2021 Last podiatry visit  - none Last cardiology evaluation - Dr. Ryder (7/21) Last stress test - 4/21 Last 2-D Echo -4/21 Last nephrology evaluation - none Last neurology evaluation-none

## 2023-12-13 NOTE — ASSESSMENT
[Diabetes Foot Care] : diabetes foot care [Long Term Vascular Complications] : long term vascular complications of diabetes [Carbohydrate Consistent Diet] : carbohydrate consistent diet [Importance of Diet and Exercise] : importance of diet and exercise to improve glycemic control, achieve weight loss and improve cardiovascular health [Exercise/Effect on Glucose] : exercise/effect on glucose [Hypoglycemia Management] : hypoglycemia management [Glucagon Use] : glucagon use [Self Monitoring of Blood Glucose] : self monitoring of blood glucose [Retinopathy Screening] : Patient was referred to ophthalmology for retinopathy screening [Diabetic Medications] : Risks and benefits of diabetic medications were discussed [FreeTextEntry1] : 1. T2DM, well controlled - Jardiance 25 mg qd,  - d/c Ozempic and trial of Mounjaro 2.5 mg qw (R+B) and uptitrate as directed - potential retrying a small dose of metformin ER reviewed - Crestor 20 mg HS (LDL goal < 70, will benefit with TG). can add fish oil. improve the diet, exercises - Losartan 50 mg qd. Monitor blood pressure - f/u ophthalmologist  2. Abnormal TFT's - normal TFT's. elevated total T4 can be secondary to binding proteins  RTC 4 mos.

## 2023-12-20 LAB
ALBUMIN SERPL ELPH-MCNC: 4.9 G/DL
ALP BLD-CCNC: 44 U/L
ALT SERPL-CCNC: 29 U/L
ANION GAP SERPL CALC-SCNC: 13 MMOL/L
AST SERPL-CCNC: 21 U/L
BILIRUB SERPL-MCNC: 0.5 MG/DL
BUN SERPL-MCNC: 14 MG/DL
CALCIUM SERPL-MCNC: 9.4 MG/DL
CHLORIDE SERPL-SCNC: 101 MMOL/L
CHOLEST SERPL-MCNC: 160 MG/DL
CO2 SERPL-SCNC: 24 MMOL/L
CREAT SERPL-MCNC: 0.62 MG/DL
EGFR: 107 ML/MIN/1.73M2
ESTIMATED AVERAGE GLUCOSE: 160 MG/DL
FRUCTOSAMINE SERPL-MCNC: 301 UMOL/L
GLUCOSE SERPL-MCNC: 109 MG/DL
HBA1C MFR BLD HPLC: 7.2 %
HDLC SERPL-MCNC: 73 MG/DL
LDLC SERPL CALC-MCNC: 57 MG/DL
NONHDLC SERPL-MCNC: 87 MG/DL
POTASSIUM SERPL-SCNC: 4.4 MMOL/L
PROT SERPL-MCNC: 7.4 G/DL
SODIUM SERPL-SCNC: 138 MMOL/L
T4 FREE SERPL-MCNC: 1.4 NG/DL
THYROGLOB AB SERPL-ACNC: <20 IU/ML
THYROPEROXIDASE AB SERPL IA-ACNC: 20.5 IU/ML
TRIGL SERPL-MCNC: 183 MG/DL
TSH SERPL-ACNC: 0.83 UIU/ML

## 2024-02-04 ENCOUNTER — RX RENEWAL (OUTPATIENT)
Age: 64
End: 2024-02-04

## 2024-02-04 RX ORDER — BLOOD SUGAR DIAGNOSTIC
STRIP MISCELLANEOUS
Qty: 300 | Refills: 1 | Status: ACTIVE | COMMUNITY
Start: 2023-09-25 | End: 1900-01-01

## 2024-04-02 ENCOUNTER — RX RENEWAL (OUTPATIENT)
Age: 64
End: 2024-04-02

## 2024-04-02 RX ORDER — ROSUVASTATIN CALCIUM 20 MG/1
20 TABLET, FILM COATED ORAL
Qty: 90 | Refills: 2 | Status: ACTIVE | COMMUNITY
Start: 2022-04-25 | End: 1900-01-01

## 2024-04-16 ENCOUNTER — APPOINTMENT (OUTPATIENT)
Dept: ENDOCRINOLOGY | Facility: CLINIC | Age: 64
End: 2024-04-16
Payer: COMMERCIAL

## 2024-04-16 VITALS
WEIGHT: 201 LBS | BODY MASS INDEX: 29.77 KG/M2 | OXYGEN SATURATION: 99 % | RESPIRATION RATE: 16 BRPM | DIASTOLIC BLOOD PRESSURE: 72 MMHG | TEMPERATURE: 97.6 F | HEART RATE: 78 BPM | SYSTOLIC BLOOD PRESSURE: 130 MMHG | HEIGHT: 69 IN

## 2024-04-16 DIAGNOSIS — E11.65 TYPE 2 DIABETES MELLITUS WITH HYPERGLYCEMIA: ICD-10-CM

## 2024-04-16 DIAGNOSIS — R94.6 ABNORMAL RESULTS OF THYROID FUNCTION STUDIES: ICD-10-CM

## 2024-04-16 DIAGNOSIS — I10 ESSENTIAL (PRIMARY) HYPERTENSION: ICD-10-CM

## 2024-04-16 DIAGNOSIS — E78.5 HYPERLIPIDEMIA, UNSPECIFIED: ICD-10-CM

## 2024-04-16 LAB — GLUCOSE BLDC GLUCOMTR-MCNC: 111

## 2024-04-16 PROCEDURE — 99214 OFFICE O/P EST MOD 30 MIN: CPT | Mod: 25

## 2024-04-16 PROCEDURE — 36415 COLL VENOUS BLD VENIPUNCTURE: CPT

## 2024-04-16 PROCEDURE — 82962 GLUCOSE BLOOD TEST: CPT

## 2024-04-16 NOTE — HISTORY OF PRESENT ILLNESS
[FreeTextEntry1] : F/u for diabetes management  *** Apr 16, 2024 ***  feels well, offers no c/o. weight has been stable taking  Jardiance 25 mg qd, Mounjaro 2.5 mg qw , Losartan 50 mg ,  Crestor 20 mg HS reports fbs- 110's- 120's  *** Dec 13, 2023 ***  feels well. gained a few lbs over the holidays staying on Jardiance 25 mg qd, Ozempic 0.5 mg qw , Losartan 50 mg ,  Crestor 20 mg HS reports fbs- 120's  *** Aug 28, 2023 ***  feels well, diet has been worse over the past several months but able to keep the weight down Jardiance 25 mg qd, Ozempic 0.5 mg qw , Losartan 50 mg ,  Crestor 20 mg HS reports fbs -   *** Apr 18, 2023 ***  feels great, no new c/o. gained some weight  stays on Jardiance 25 mg qd, Ozempic 0.5 mg qw,  Losartan 50 mg ,  Crestor 20 mg HS reports fbs- 110's  *** Dec 15, 2022 ***  feels great, no new c/o. keeping the weight stable stays on Jardiance 25 mg qd, Ozempic 0.5 mg qw,  Losartan 50 mg (went back on a smaller dose),  Crestor 20 mg HS reports fbs- 120's  *** Aug 15, 2022 ***  feels great, lost more weight. physically active on Jardiance 25 mg qd, Ozempic 0.5 mg qw, Losartan 100 mg,  Crestor 20 mg HS stopped blood thinners reports fbs- 110-120, ppg- not checking  *** Apr 25, 2022 ***  taking Ozempic 0.5 mg qw, Jardiance 25 mg, Losartan 100 mg, Lipitor 20 mg HS feels great, but gained some weight. not exercising now. Diet might be a bit worse, wine - 2 glasses a day with snacks labs from 4/5/22- a1c- 6.1, TSH- 1.96, TG- 224, LDL- 75 reports fbs- under 120, not checking   HISTORY OF PRESENT ILLNESS.   Mr. LARSON was diagnosed with Diabetes Mellitus Type 2  more than 5 years ago. He reports history HTN, dyslipidemia, CAD (s/p PTCA x 1 in 05/21) and denies known complications of retinopathy, nephropathy, or neuropathy. He is presently on Jardiance 25 mg, Losartan 50 mg, Lipitor 20 mg HS. He was on metformin before, but stopped because of the stomach discomfort Blood sugars are checked once a day.  Did not bring log book, but reported are typically as following: FBS- 120-160's, ppg- not checking Hypoglycemia frequency: none Fingerstick glucose in the office today is 113 mg/dL (fasting).  Diet: not following ADA Exercise: none. Owns a transportation business.  Lab review (8/21): a1c- 6.8, TSH- 0.53, T4- 15.3, LDL- 75, neg urine microalbumin, creat- 0.64, calcium- 8.8  Last dilated eye - 2021 Last podiatry visit  - none Last cardiology evaluation - Dr. Ryder (7/21) Last stress test - 4/21 Last 2-D Echo -4/21 Last nephrology evaluation - none Last neurology evaluation-none

## 2024-04-16 NOTE — ASSESSMENT
[Diabetes Foot Care] : diabetes foot care [Long Term Vascular Complications] : long term vascular complications of diabetes [Carbohydrate Consistent Diet] : carbohydrate consistent diet [Importance of Diet and Exercise] : importance of diet and exercise to improve glycemic control, achieve weight loss and improve cardiovascular health [Exercise/Effect on Glucose] : exercise/effect on glucose [Hypoglycemia Management] : hypoglycemia management [Glucagon Use] : glucagon use [Self Monitoring of Blood Glucose] : self monitoring of blood glucose [Retinopathy Screening] : Patient was referred to ophthalmology for retinopathy screening [Diabetic Medications] : Risks and benefits of diabetic medications were discussed [FreeTextEntry1] : 1. T2DM, suboptimally controlled - Jardiance 25 mg qd,  - Mounjaro 2.5 mg qw - patient declined uptitration at present; will reassess post labs - potential retrying a small dose of metformin ER reviewed - Crestor 20 mg HS (LDL goal < 70, will benefit with TG). can add fish oil. improve the diet, exercises - Losartan 50 mg qd. Monitor blood pressure - f/u ophthalmologist  2. Abnormal TFT's - normal TFT's. elevated total T4 can be secondary to binding proteins  RTC 4 mos.

## 2024-04-18 LAB
ALBUMIN SERPL ELPH-MCNC: 5.2 G/DL
ALP BLD-CCNC: 42 U/L
ALT SERPL-CCNC: 28 U/L
ANION GAP SERPL CALC-SCNC: 22 MMOL/L
AST SERPL-CCNC: 23 U/L
BILIRUB SERPL-MCNC: 0.4 MG/DL
BUN SERPL-MCNC: 14 MG/DL
CALCIUM SERPL-MCNC: 9.7 MG/DL
CHLORIDE SERPL-SCNC: 101 MMOL/L
CHOLEST SERPL-MCNC: 158 MG/DL
CO2 SERPL-SCNC: 17 MMOL/L
CREAT SERPL-MCNC: 0.69 MG/DL
CREAT SPEC-SCNC: 51 MG/DL
EGFR: 103 ML/MIN/1.73M2
ESTIMATED AVERAGE GLUCOSE: 143 MG/DL
FOLATE SERPL-MCNC: 15.5 NG/ML
FRUCTOSAMINE SERPL-MCNC: 300 UMOL/L
GLUCOSE SERPL-MCNC: 116 MG/DL
HBA1C MFR BLD HPLC: 6.6 %
HDLC SERPL-MCNC: 64 MG/DL
LDLC SERPL CALC-MCNC: 64 MG/DL
MICROALBUMIN 24H UR DL<=1MG/L-MCNC: <1.2 MG/DL
MICROALBUMIN/CREAT 24H UR-RTO: NORMAL MG/G
NONHDLC SERPL-MCNC: 94 MG/DL
POTASSIUM SERPL-SCNC: 4.6 MMOL/L
PROT SERPL-MCNC: 7.5 G/DL
SODIUM SERPL-SCNC: 140 MMOL/L
T4 FREE SERPL-MCNC: 1.5 NG/DL
TRIGL SERPL-MCNC: 186 MG/DL
TSH SERPL-ACNC: 1.25 UIU/ML
VIT B12 SERPL-MCNC: 530 PG/ML

## 2024-05-21 ENCOUNTER — RX RENEWAL (OUTPATIENT)
Age: 64
End: 2024-05-21

## 2024-05-21 RX ORDER — TIRZEPATIDE 5 MG/.5ML
5 INJECTION, SOLUTION SUBCUTANEOUS
Qty: 4 | Refills: 4 | Status: ACTIVE | COMMUNITY
Start: 2023-12-13 | End: 1900-01-01

## 2024-06-27 ENCOUNTER — RX RENEWAL (OUTPATIENT)
Age: 64
End: 2024-06-27

## 2024-06-28 ENCOUNTER — RX RENEWAL (OUTPATIENT)
Age: 64
End: 2024-06-28

## 2024-08-13 ENCOUNTER — APPOINTMENT (OUTPATIENT)
Dept: ENDOCRINOLOGY | Facility: CLINIC | Age: 64
End: 2024-08-13
Payer: COMMERCIAL

## 2024-08-13 VITALS
BODY MASS INDEX: 27.7 KG/M2 | OXYGEN SATURATION: 98 % | HEART RATE: 76 BPM | TEMPERATURE: 98 F | SYSTOLIC BLOOD PRESSURE: 118 MMHG | HEIGHT: 69 IN | WEIGHT: 187 LBS | DIASTOLIC BLOOD PRESSURE: 78 MMHG | RESPIRATION RATE: 16 BRPM

## 2024-08-13 DIAGNOSIS — R94.6 ABNORMAL RESULTS OF THYROID FUNCTION STUDIES: ICD-10-CM

## 2024-08-13 DIAGNOSIS — I10 ESSENTIAL (PRIMARY) HYPERTENSION: ICD-10-CM

## 2024-08-13 DIAGNOSIS — E78.5 HYPERLIPIDEMIA, UNSPECIFIED: ICD-10-CM

## 2024-08-13 DIAGNOSIS — E11.9 TYPE 2 DIABETES MELLITUS W/OUT COMPLICATIONS: ICD-10-CM

## 2024-08-13 LAB
ALBUMIN SERPL ELPH-MCNC: 4.4 G/DL
ALP BLD-CCNC: 43 U/L
ALT SERPL-CCNC: 18 U/L
ANION GAP SERPL CALC-SCNC: 15 MMOL/L
AST SERPL-CCNC: 16 U/L
BILIRUB SERPL-MCNC: 0.2 MG/DL
BUN SERPL-MCNC: 12 MG/DL
CALCIUM SERPL-MCNC: 9.4 MG/DL
CHLORIDE SERPL-SCNC: 100 MMOL/L
CHOLEST SERPL-MCNC: 135 MG/DL
CO2 SERPL-SCNC: 24 MMOL/L
CREAT SERPL-MCNC: 0.65 MG/DL
EGFR: 105 ML/MIN/1.73M2
ESTIMATED AVERAGE GLUCOSE: 157 MG/DL
FRUCTOSAMINE SERPL-MCNC: 270 UMOL/L
GLUCOSE BLDC GLUCOMTR-MCNC: 111
GLUCOSE SERPL-MCNC: 111 MG/DL
HBA1C MFR BLD HPLC: 7.1 %
HDLC SERPL-MCNC: 61 MG/DL
LDLC SERPL CALC-MCNC: 60 MG/DL
NONHDLC SERPL-MCNC: 73 MG/DL
POTASSIUM SERPL-SCNC: 4.3 MMOL/L
PROT SERPL-MCNC: 7 G/DL
SODIUM SERPL-SCNC: 139 MMOL/L
T4 FREE SERPL-MCNC: 1.4 NG/DL
TRIGL SERPL-MCNC: 63 MG/DL
TSH SERPL-ACNC: 0.61 UIU/ML

## 2024-08-13 PROCEDURE — 36415 COLL VENOUS BLD VENIPUNCTURE: CPT

## 2024-08-13 PROCEDURE — 99214 OFFICE O/P EST MOD 30 MIN: CPT | Mod: 25

## 2024-08-13 NOTE — HISTORY OF PRESENT ILLNESS
[FreeTextEntry1] : F/u for diabetes management  *** Aug 13, 2024 ***  admitted to [facility] 2 weeks ago with a viral pericarditis released on colchicine and metoprolol feels much better. still residual back pain, but no SOB unaware of the lab results from the hospital remains on Jardiance 25 mg qd, Mounjaro 5 mg qw , Losartan 25 mg (dose lowered),  Crestor 20 mg HS by report, fbs- low 100's  *** Apr 16, 2024 ***  feels well, offers no c/o. weight has been stable taking  Jardiance 25 mg qd, Mounjaro 2.5 mg qw , Losartan 50 mg ,  Crestor 20 mg HS reports fbs- 110's- 120's  *** Dec 13, 2023 ***  feels well. gained a few lbs over the holidays staying on Jardiance 25 mg qd, Ozempic 0.5 mg qw , Losartan 50 mg ,  Crestor 20 mg HS reports fbs- 120's  *** Aug 28, 2023 ***  feels well, diet has been worse over the past several months but able to keep the weight down Jardiance 25 mg qd, Ozempic 0.5 mg qw , Losartan 50 mg ,  Crestor 20 mg HS reports fbs -   *** Apr 18, 2023 ***  feels great, no new c/o. gained some weight  stays on Jardiance 25 mg qd, Ozempic 0.5 mg qw,  Losartan 50 mg ,  Crestor 20 mg HS reports fbs- 110's  *** Dec 15, 2022 ***  feels great, no new c/o. keeping the weight stable stays on Jardiance 25 mg qd, Ozempic 0.5 mg qw,  Losartan 50 mg (went back on a smaller dose),  Crestor 20 mg HS reports fbs- 120's  *** Aug 15, 2022 ***  feels great, lost more weight. physically active on Jardiance 25 mg qd, Ozempic 0.5 mg qw, Losartan 100 mg,  Crestor 20 mg HS stopped blood thinners reports fbs- 110-120, ppg- not checking  *** Apr 25, 2022 ***  taking Ozempic 0.5 mg qw, Jardiance 25 mg, Losartan 100 mg, Lipitor 20 mg HS feels great, but gained some weight. not exercising now. Diet might be a bit worse, wine - 2 glasses a day with snacks labs from 4/5/22- a1c- 6.1, TSH- 1.96, TG- 224, LDL- 75 reports fbs- under 120, not checking   HISTORY OF PRESENT ILLNESS.   Mr. LARSON was diagnosed with Diabetes Mellitus Type 2  more than 5 years ago. He reports history HTN, dyslipidemia, CAD (s/p PTCA x 1 in 05/21) and denies known complications of retinopathy, nephropathy, or neuropathy. He is presently on Jardiance 25 mg, Losartan 50 mg, Lipitor 20 mg HS. He was on metformin before, but stopped because of the stomach discomfort Blood sugars are checked once a day.  Did not bring log book, but reported are typically as following: FBS- 120-160's, ppg- not checking Hypoglycemia frequency: none Fingerstick glucose in the office today is 113 mg/dL (fasting).  Diet: not following ADA Exercise: none. Owns a transportation business.  Lab review (8/21): a1c- 6.8, TSH- 0.53, T4- 15.3, LDL- 75, neg urine microalbumin, creat- 0.64, calcium- 8.8  Last dilated eye - 2021 Last podiatry visit  - none Last cardiology evaluation - Dr. Ryder (7/21) Last stress test - 4/21 Last 2-D Echo -4/21 Last nephrology evaluation - none Last neurology evaluation-none

## 2024-08-13 NOTE — ASSESSMENT
[Diabetes Foot Care] : diabetes foot care [Long Term Vascular Complications] : long term vascular complications of diabetes [Carbohydrate Consistent Diet] : carbohydrate consistent diet [Importance of Diet and Exercise] : importance of diet and exercise to improve glycemic control, achieve weight loss and improve cardiovascular health [Exercise/Effect on Glucose] : exercise/effect on glucose [Hypoglycemia Management] : hypoglycemia management [Glucagon Use] : glucagon use [Self Monitoring of Blood Glucose] : self monitoring of blood glucose [Retinopathy Screening] : Patient was referred to ophthalmology for retinopathy screening [Diabetic Medications] : Risks and benefits of diabetic medications were discussed [FreeTextEntry1] : 1. T2DM, appears better controlled - Jardiance 25 mg qd,  - Mounjaro 5 mg qw  - potential retrying a small dose of metformin ER reviewed - Crestor 20 mg HS (LDL goal < 70, will benefit with TG). can add fish oil. improve the diet, exercises - Losartan 50 mg qd. Monitor blood pressure - f/u ophthalmologist  2. Abnormal TFT's - normal TFT's. elevated total T4 can be secondary to binding proteins  RTC 4 mos.

## 2024-08-14 LAB
CREAT SPEC-SCNC: 52 MG/DL
MICROALBUMIN 24H UR DL<=1MG/L-MCNC: <1.2 MG/DL
MICROALBUMIN/CREAT 24H UR-RTO: NORMAL MG/G

## 2024-09-08 ENCOUNTER — NON-APPOINTMENT (OUTPATIENT)
Age: 64
End: 2024-09-08

## 2024-10-17 RX ORDER — TIRZEPATIDE 5 MG/.5ML
5 INJECTION, SOLUTION SUBCUTANEOUS
Qty: 3 | Refills: 0 | Status: ACTIVE | COMMUNITY
Start: 2024-10-16 | End: 1900-01-01

## 2024-12-16 ENCOUNTER — APPOINTMENT (OUTPATIENT)
Dept: ENDOCRINOLOGY | Facility: CLINIC | Age: 64
End: 2024-12-16
Payer: COMMERCIAL

## 2024-12-16 VITALS
TEMPERATURE: 98.1 F | OXYGEN SATURATION: 99 % | RESPIRATION RATE: 16 BRPM | HEART RATE: 67 BPM | BODY MASS INDEX: 27.7 KG/M2 | SYSTOLIC BLOOD PRESSURE: 130 MMHG | HEIGHT: 69 IN | DIASTOLIC BLOOD PRESSURE: 84 MMHG | WEIGHT: 187 LBS

## 2024-12-16 DIAGNOSIS — E78.5 HYPERLIPIDEMIA, UNSPECIFIED: ICD-10-CM

## 2024-12-16 DIAGNOSIS — E11.9 TYPE 2 DIABETES MELLITUS W/OUT COMPLICATIONS: ICD-10-CM

## 2024-12-16 DIAGNOSIS — I10 ESSENTIAL (PRIMARY) HYPERTENSION: ICD-10-CM

## 2024-12-16 LAB — GLUCOSE BLDC GLUCOMTR-MCNC: 111

## 2024-12-16 PROCEDURE — 99214 OFFICE O/P EST MOD 30 MIN: CPT

## 2024-12-16 PROCEDURE — 36415 COLL VENOUS BLD VENIPUNCTURE: CPT

## 2024-12-16 RX ORDER — LOSARTAN POTASSIUM 25 MG/1
25 TABLET, FILM COATED ORAL
Refills: 0 | Status: ACTIVE | COMMUNITY

## 2024-12-19 LAB
ALBUMIN SERPL ELPH-MCNC: 4.8 G/DL
ALP BLD-CCNC: 43 U/L
ALT SERPL-CCNC: 23 U/L
ANION GAP SERPL CALC-SCNC: 15 MMOL/L
AST SERPL-CCNC: 16 U/L
BILIRUB SERPL-MCNC: 0.2 MG/DL
BUN SERPL-MCNC: 16 MG/DL
CALCIUM SERPL-MCNC: 9.6 MG/DL
CHLORIDE SERPL-SCNC: 100 MMOL/L
CHOLEST SERPL-MCNC: 148 MG/DL
CO2 SERPL-SCNC: 27 MMOL/L
CREAT SERPL-MCNC: 0.73 MG/DL
CREAT SPEC-SCNC: 68 MG/DL
EGFR: 102 ML/MIN/1.73M2
ESTIMATED AVERAGE GLUCOSE: 166 MG/DL
FOLATE SERPL-MCNC: 13.8 NG/ML
GLUCOSE SERPL-MCNC: 106 MG/DL
HBA1C MFR BLD HPLC: 7.4 %
HDLC SERPL-MCNC: 59 MG/DL
LDLC SERPL CALC-MCNC: 63 MG/DL
MICROALBUMIN 24H UR DL<=1MG/L-MCNC: <1.2 MG/DL
MICROALBUMIN/CREAT 24H UR-RTO: NORMAL MG/G
NONHDLC SERPL-MCNC: 89 MG/DL
POTASSIUM SERPL-SCNC: 4.4 MMOL/L
PROT SERPL-MCNC: 7.5 G/DL
SODIUM SERPL-SCNC: 142 MMOL/L
T4 FREE SERPL-MCNC: 1.2 NG/DL
TRIGL SERPL-MCNC: 150 MG/DL
TSH SERPL-ACNC: 2.33 UIU/ML
VIT B12 SERPL-MCNC: 417 PG/ML

## 2024-12-31 ENCOUNTER — APPOINTMENT (OUTPATIENT)
Dept: ENDOCRINOLOGY | Facility: CLINIC | Age: 64
End: 2024-12-31

## 2025-01-08 ENCOUNTER — RX RENEWAL (OUTPATIENT)
Age: 65
End: 2025-01-08

## 2025-03-20 ENCOUNTER — RX RENEWAL (OUTPATIENT)
Age: 65
End: 2025-03-20

## 2025-04-21 ENCOUNTER — APPOINTMENT (OUTPATIENT)
Dept: ENDOCRINOLOGY | Facility: CLINIC | Age: 65
End: 2025-04-21
Payer: COMMERCIAL

## 2025-04-21 ENCOUNTER — NON-APPOINTMENT (OUTPATIENT)
Age: 65
End: 2025-04-21

## 2025-04-21 VITALS
RESPIRATION RATE: 16 BRPM | DIASTOLIC BLOOD PRESSURE: 80 MMHG | HEIGHT: 69 IN | WEIGHT: 181 LBS | BODY MASS INDEX: 26.81 KG/M2 | OXYGEN SATURATION: 99 % | SYSTOLIC BLOOD PRESSURE: 134 MMHG | HEART RATE: 66 BPM

## 2025-04-21 DIAGNOSIS — I10 ESSENTIAL (PRIMARY) HYPERTENSION: ICD-10-CM

## 2025-04-21 DIAGNOSIS — E78.5 HYPERLIPIDEMIA, UNSPECIFIED: ICD-10-CM

## 2025-04-21 DIAGNOSIS — R94.6 ABNORMAL RESULTS OF THYROID FUNCTION STUDIES: ICD-10-CM

## 2025-04-21 DIAGNOSIS — E11.65 TYPE 2 DIABETES MELLITUS WITH HYPERGLYCEMIA: ICD-10-CM

## 2025-04-21 PROCEDURE — 36415 COLL VENOUS BLD VENIPUNCTURE: CPT

## 2025-04-21 PROCEDURE — 99214 OFFICE O/P EST MOD 30 MIN: CPT

## 2025-04-22 LAB
25(OH)D3 SERPL-MCNC: 28.6 NG/ML
ALBUMIN SERPL ELPH-MCNC: 4.8 G/DL
ALP BLD-CCNC: 55 U/L
ALT SERPL-CCNC: 15 U/L
ANION GAP SERPL CALC-SCNC: 17 MMOL/L
AST SERPL-CCNC: 15 U/L
BILIRUB SERPL-MCNC: 0.4 MG/DL
BUN SERPL-MCNC: 16 MG/DL
CALCIUM SERPL-MCNC: 9.6 MG/DL
CHLORIDE SERPL-SCNC: 99 MMOL/L
CHOLEST SERPL-MCNC: 145 MG/DL
CO2 SERPL-SCNC: 22 MMOL/L
CREAT SERPL-MCNC: 0.72 MG/DL
CREAT SPEC-SCNC: 47 MG/DL
EGFRCR SERPLBLD CKD-EPI 2021: 101 ML/MIN/1.73M2
ESTIMATED AVERAGE GLUCOSE: 146 MG/DL
FOLATE SERPL-MCNC: 13.6 NG/ML
FRUCTOSAMINE SERPL-MCNC: 273 UMOL/L
GLUCOSE SERPL-MCNC: 107 MG/DL
HBA1C MFR BLD HPLC: 6.7 %
HDLC SERPL-MCNC: 59 MG/DL
LDLC SERPL-MCNC: 66 MG/DL
MICROALBUMIN 24H UR DL<=1MG/L-MCNC: <1.2 MG/DL
MICROALBUMIN/CREAT 24H UR-RTO: NORMAL MG/G
NONHDLC SERPL-MCNC: 86 MG/DL
POTASSIUM SERPL-SCNC: 5 MMOL/L
PROT SERPL-MCNC: 7.6 G/DL
SODIUM SERPL-SCNC: 138 MMOL/L
T4 FREE SERPL-MCNC: 1.4 NG/DL
TRIGL SERPL-MCNC: 112 MG/DL
TSH SERPL-ACNC: 0.85 UIU/ML
VIT B12 SERPL-MCNC: 369 PG/ML

## 2025-07-21 ENCOUNTER — APPOINTMENT (OUTPATIENT)
Dept: ENDOCRINOLOGY | Facility: CLINIC | Age: 65
End: 2025-07-21
Payer: MEDICARE

## 2025-07-21 VITALS
WEIGHT: 175 LBS | HEIGHT: 69 IN | SYSTOLIC BLOOD PRESSURE: 122 MMHG | RESPIRATION RATE: 16 BRPM | BODY MASS INDEX: 25.92 KG/M2 | OXYGEN SATURATION: 97 % | HEART RATE: 70 BPM | DIASTOLIC BLOOD PRESSURE: 75 MMHG

## 2025-07-21 DIAGNOSIS — E11.9 TYPE 2 DIABETES MELLITUS W/OUT COMPLICATIONS: ICD-10-CM

## 2025-07-21 DIAGNOSIS — I10 ESSENTIAL (PRIMARY) HYPERTENSION: ICD-10-CM

## 2025-07-21 DIAGNOSIS — E78.5 HYPERLIPIDEMIA, UNSPECIFIED: ICD-10-CM

## 2025-07-21 LAB
GLUCOSE BLDC GLUCOMTR-MCNC: 178
HBA1C MFR BLD HPLC: 6.5

## 2025-07-21 PROCEDURE — 99204 OFFICE O/P NEW MOD 45 MIN: CPT | Mod: 25

## 2025-07-21 PROCEDURE — 83036 HEMOGLOBIN GLYCOSYLATED A1C: CPT | Mod: QW

## 2025-07-21 PROCEDURE — 82962 GLUCOSE BLOOD TEST: CPT
